# Patient Record
Sex: MALE | Race: WHITE
[De-identification: names, ages, dates, MRNs, and addresses within clinical notes are randomized per-mention and may not be internally consistent; named-entity substitution may affect disease eponyms.]

---

## 2020-03-28 ENCOUNTER — HOSPITAL ENCOUNTER (EMERGENCY)
Dept: HOSPITAL 25 - UCCORT | Age: 55
Discharge: HOME | End: 2020-03-28
Payer: COMMERCIAL

## 2020-03-28 VITALS — SYSTOLIC BLOOD PRESSURE: 137 MMHG | DIASTOLIC BLOOD PRESSURE: 89 MMHG

## 2020-03-28 DIAGNOSIS — I10: ICD-10-CM
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
## 2020-03-28 DIAGNOSIS — F17.210: ICD-10-CM
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
## 2020-03-28 DIAGNOSIS — Z79.01: ICD-10-CM

## 2020-03-28 DIAGNOSIS — Z79.899: ICD-10-CM

## 2020-03-28 DIAGNOSIS — I48.91: ICD-10-CM
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
## 2020-03-28 DIAGNOSIS — K21.9: ICD-10-CM

## 2020-03-28 DIAGNOSIS — E03.9: ICD-10-CM

## 2020-03-28 DIAGNOSIS — Z79.890: ICD-10-CM

## 2020-03-28 DIAGNOSIS — S93.402A: Primary | ICD-10-CM

## 2020-03-28 PROCEDURE — 99213 OFFICE O/P EST LOW 20 MIN: CPT

## 2020-03-28 PROCEDURE — G0463 HOSPITAL OUTPT CLINIC VISIT: HCPCS

## 2020-03-28 NOTE — UC
Lower Extremity/Ankle HPI





- HPI Summary


HPI Summary: 





53 yo man, who slipped on ice while leaving his barn evening of 3/25/20. He has 

increasing swelling in the medial malleolus and pain with weight bearing.  








- History of Current Complaint


Chief Complaint: UCLowerExtremity


Stated Complaint: LEFT ANKLE INJURY


Hx Obtained From: Patient


Onset/Duration: Sudden Onset, Lasting Days - 3


Severity Initially: Moderate


Severity Currently: Moderate


Pain Intensity: 7


Aggravating Factor(s): Standing, Ambulation


Alleviating Factor(s): Rest, Elevation, Ice, OTC Meds - has been taking 

ibuprofen 800mg


Able to Bear Weight: Yes - partial





- Risk Factors


Gout Risk Factors: Hypertension, Obesity


DVT Risk Factors: Negative


Septic Arthritis Risk Factor: Negative





- Allergies/Home Medications


Allergies/Adverse Reactions: 


 Allergies











Allergy/AdvReac Type Severity Reaction Status Date / Time


 


No Known Allergies Allergy   Verified 20 12:05











Home Medications: 


 Home Medications





Amlodipine Besylate [Norvasc] 10 mg PO DAILY 20 [History Confirmed ]


Apixaban* [Eliquis*] 5 mg PO BID 20 [History Confirmed 20]


Colchicine* [Colcrys*] 0.6 mg PO DAILY 20 [History Confirmed 20]


Dexlansoprazole [Dexilant] 60 mg PO DAILY 20 [History Confirmed 20]


Hydrochlorothiazide TAB* [Hydrodiuril TAB*] 25 mg PO DAILY 20 [History 

Confirmed 20]


Levothyroxine TAB* [Synthroid TAB*] 25 mcg PO QAM 20 [History Confirmed ]


Metoprolol Succinate [Metoprolol Succinate ER] 25 mg PO DAILY 20 [History 

Confirmed 20]


Potassium Chlor TAB* [Klor Con ER TAB*] 10 meq PO DAILY 20 [History 

Confirmed 20]











PMH/Surg Hx/FS Hx/Imm Hx


Previously Healthy: Yes - recent onset of a fib


Endocrine History: Hypothyroidism


Cardiovascular History: Hypertension, Atrial Fibrillation - overnight 

observation due to onset of a fib last week


GI/ History: Gastroesophageal Reflux





- Surgical History


Surgical History: None





- Family History


Known Family History: Positive: Cardiac Disease





- Social History


Occupation: Employed Full-time


Alcohol Use: Occasionally


Substance Use Type: None


Smoking Status (MU): Heavy Every Day Tobacco Smoker


Type: Cigarettes


Amount Used/How Often: 1 ppd





Review of Systems


All Other Systems Reviewed And Are Negative: Yes


Constitutional: Positive: Negative


Skin: Positive: Negative


Eyes: Positive: Negative


ENT: Positive: Negative


Respiratory: Positive: Negative


Cardiovascular: Negative: Palpitations - converted back to sinus rhythm.


Gastrointestinal: Positive: Negative


Genitourinary: Positive: Negative


Motor: Positive: Decreased ROM - left ankle


Musculoskeletal: Positive: Arthralgia


Neurological/Mental Status: Positive: Negative


Psychological: Positive: Negative


Is Patient Immunocompromised?: No





Physical Exam


Triage Information Reviewed: Yes


Appearance: Well-Appearing, Pain Distress - mild to moderate, Obese


Vital Signs: 


 Initial Vital Signs











Temp  98 F   20 12:08


 


Pulse  96   20 12:08


 


Resp  17   20 12:08


 


BP  137/89   20 12:08


 


Pulse Ox  100   20 12:08











ENT: Positive: Normal ENT inspection


Neck: Positive: Supple, Nontender, No Lymphadenopathy


Respiratory: Positive: Lungs clear, Normal breath sounds


Cardiovascular: Positive: RRR, No Murmur


Musculoskeletal: Positive: Strength Intact, No Edema, ROM Limited @ - left ankle


Neurological Exam: Other - left ankle with moderate swelling  and ecchymosis of 

the medial malleolus. Mild erythema of the first MTP joint but no pain


Psychological Exam: Normal


Skin Exam: Normal





Diagnostics





- Radiology


  ** No standard instances


Radiology Interpretation Completed By: Radiologist - Patient Name:        KARLOS NINO                                                                  Medical 

Record#: [id number] Ordering Physician: Bibiana Morejon MD                       

                                        Acct.#: [id number] :     1965        Age: 54   Sex: M                                                   

     Location: URGENT CARE [facility] Exam Date: 20 1205                 

                                                          ADM Status: REG ER 

Order Information:                        ANKLE LEFT 3+VWS Accession Number:   

                      [id number] CPT:                                      

30098 Indication: Left ankle pain. 3 views of the left ankle demonstrates no 

fracture or dislocation. No other bone or joint abnormalities identified. 

IMPRESSION: No fracture of the ankle is noted.   _______________________________

_____________________________ <Electronically signed by Cleopatra Schmitt MD in [place]>  

20 1236 Dictated By: Cleopatra Schmitt MD Dictated Date/Time: 20 1235 

Transcribed Date/Time: 20 1235 Copy to:    CC:Rosalio Ontiveros MD; [facility]; Bibiana Morejon MD Imaging - [facility]                 

              Imaging - [facility]                                   

Imaging - [facility]  [address]                                

     [address] 

[id number] [place] [id number] ph [phone])                                   ph ([phone])                  

                            ph ([phone])                     This report 

is only to be considered final once signed by the Provider(s) as displayed in 

the "<Electronically Signed by >" field (s). Absence of a  signature indicates 

the report is in a draft status and still needs to be finalized. In the event 

this document was created by someone other than the  signing Provider, the 

individual initiating the document will be listed in the "Entered by:" or 

"Dictated by:" fields.                                                         

      1 of 1





Lower Extremity Course/Dx





- Course


Course Of Treatment: 





CAM walker and crutches, refer orthopedics


Advised not to combine nsaid's and use of Eliquis and risks of this reviewed. 





- Differential Dx/Diagnosis


Differential Diagnosis/HQI/PQRI: Fracture (Closed), Sprain, Strain


Provider Diagnosis: 


 Sprain of left ankle








Discharge ED





- Sign-Out/Discharge


Documenting (check all that apply): Patient Departure


All imaging exams completed and their final reports reviewed: Yes





- Discharge Plan


Condition: Stable


Disposition: HOME


Patient Education Materials:  Ankle Sprain (ED)


Referrals: 


[facility] [Primary Care Provider] - 


Joselito Dale MD [Medical Doctor] - 


Additional Instructions: 


Your ankle has been immobilized due to high grade sprain. 


Please call Dr. Dale on Monday to arrange an orthopedic evaluation. 


Stop use of ibuprofen and use acetaminophen 650mg up to 4 times daily as needed 

for control of pain. 


Use ice to the medial ankle for 20 mintues every 2 to 3 hours, and keep your 

leg elevated as much as possible.  





- Billing Disposition and Condition


Condition: STABLE


Disposition: Home

## 2020-03-28 NOTE — XMS REPORT
Continuity of Care Document (CCD)

 Created on:2020



Patient:Joaquin Loera

Sex:Male

:1965

External Reference #:[id number]





Demographics







 Address  [address] [place] [address]



   [address]



   [place] [address]

 

 Home Phone  [phone]

 

 Mobile Phone  [phone]

 

 Work Phone  [phone]

 

 Preferred Language  en

 

 Marital Status  Declined to Specify/Unknown

 

 Mandaen Affiliation  Unknown

 

 Race  White

 

 Ethnic Group  Declined to Specify/Unknown









Author







 Name  Vitor Cannon NP (transmitted by agent of provider [employer] Post[employer])

 

 Address  [address] [facility]/[address]



   [place] [address]









Care Team Providers







 Name  Role  Phone

 

 Rosalio Ontiveros MD - Family Medicine  Care Team Information   [phone]
[phone]









Problems







 Active Problems  Provider  Date

 

 Benign essential hypertension  Rosalio Ontiveros M.D.  Onset: 2012

 

 Indigestion  Rosalio Ontiveros M.D.  Onset: 2012

 

 Mixed hyperlipidemia  Rosalio Ontiveros M.D.  Onset: 2012

 

 Tobacco user  Rosalio Ontiveros M.D.  Onset: 2012

 

 Peptic reflux disease  Rosalio Ontiveros M.D.  Onset: 2012

 

 Umbilical hernia  Rosalio Ontiveros M.D.  Onset: 2012

 

 Osteoarthritis  Rosalio Ontiveros M.D.  Onset: 2013

 

 Blood chemistry abnormal  Rosalio Ontiveros M.D.  Onset: 2013

 

 Gout  Rosalio Ontiveros M.D.  Onset: 2013

 

 Essential hypertension  Rosalio Ontiveros M.D.  Onset: 2016







Social History







 Type  Date  Description  Comments

 

 Birth Sex    Unknown  

 

 Tobacco Use  Start: Unknown  Current Cigarette Smoker  pt is a pack a day



       smoker for 25 years

 

 ETOH Use    Occasionally consumes  



     alcohol  

 

 Tobacco Use  Start: Unknown  Patient is a current  



     smoker, smokes every day  

 

 Smoking Status  Reviewed: 20  Patient is a current  



     smoker, smokes every day  

 

 Seat Belt/Car Seat    Always uses seat belt  







Allergies, Adverse Reactions, Alerts







 Active Allergies  Reaction  Severity  Comments  Date

 

 Ace Inhibitors        2012







Medications







 Active Medications  SIG  Qnty  Indications  Ordering Provider  Date

 

 Amoxicillin  1 tab by mouth  14caps  J20.9  Rosalio Ontiveros,  03/10/2020



           500mg  twice a day      M.D.  



 Capsules          



           

 

 Prednisone  3 tabs for 3  18tabs  J20.9  Rosalio Ontiveros,  03/10/2020



          20mg Tablets  days, then 2      M.D.  



   tabs for 3 days        



   then 1 tab for 3        



   days        

 

 Potassium Chloride ER  1 Tab PO Daily  90tabs  E87.6  Rosalio Ontiveros,  03/10/
2020



         M.D.  



 10Meq Tablets ER          



           

 

 Ibuprofen  1 by mouth three  90tabs  M10.9  Rosalio Ontiveros,  2019



         800mg Tablets  times a day as      M.D.  



   needed        









 M15.9









 Hydrochlorothiazide  take 1 tablet by  90tabs  I10  Rosalio Ontiveros,  05/10/
2019



           25mg Tablets  mouth once daily      M.D.  



           

 

 Ventolin HFA  2 puffs q4-6  36gm  J44.9  Rosalio Ontiveros,  2013



    108(90Base) mcg/Act  hours as needed      M.DKrissy  



 Aerosol          

 

 Colcrys  1 tab by mouth  90tabs  M10.9  Rosalio Ontiveros,  2013



 0.6mg Tablets  every day      M.DKrissy  



           

 

 Dexilant  1 by mouth every  90caps  K30  Rosalio Ontiveros,  2012



 60mg Capsules DR  day      M.D.  



           









 K21.0









 Norvasc  1 by mouth every day  90tabs  I10  Rosalio Ontiveros M.D.  2012



      10mg Tablets          



           









 History Medications









 Knee Brace Adjustable  on 12h off 12h  1units    Rosalio Ontiveros,  2019 -



 Hinged/Maximum Support        M.DKrissy  2020



                   Misc          



           







Immunizations







 CPT Code  Status  Date  Vaccine  Lot #

 

 [id number]  Given  2013  Flu Vaccine  [id number]

 

 65511  Given  2012  Flu Vaccine  [id number]

 

 74388  Given  2010  TDaP  

 

 92310  Given  2010  Flu Vaccine  









 









 53229  Refused  2020  Flu Virus Vaccine, Quadrivalent, Slit Virus, Im 
Use  







Vital Signs







 Date  Vital  Result  Comment

 

 03/10/2020  1:46pm  BP Systolic  148 mmHg  









 BP Diastolic  76 mmHg  

 

 Height  72 inches  6'0"

 

 Weight  262.00 lb  

 

 BMI (Body Mass Index)  35.5 kg/m2  

 

 Heart Rate  99 /min  

 

 Respiratory Rate  16 /min  









 2020  8:44am  BP Systolic  160 mmHg  









 BP Diastolic  82 mmHg  

 

 BP Systolic Recheck  158 mmHg  

 

 BP Diastolic Recheck  76 mmHg  

 

 Height  72 inches  6'0"

 

 Weight  265.00 lb  

 

 BMI (Body Mass Index)  35.9 kg/m2  

 

 Heart Rate  107 /min  

 

 Respiratory Rate  16 /min  







Results







 Test  Acquired Date  Facility  Test  Result  H/L  Range  Note

 

 CBS W/Automated  2020  [facility]  White Blood  9.4 K/uL  Normal  3.4-10.5
  1



 Diff      Count        









 Red Blood Count  4.87 M/uL  Normal  4.20-5.80  

 

 Hemoglobin  15.6 gm/dL  Normal  12.8-17.0  

 

 Hematocrit  45.4 %  Normal  38.0-48.0  

 

 Mean Cell Volume  93.2 fl  Normal  80.0-96.0  

 

 Mean Corpuscular HGB  32.0 pg  Normal  27.0-33.0  

 

 Mean Corpuscular HGB Conc  34.4 g/dL  Normal  31.7-36.0  

 

 Platelet Count  224 K/uL  Normal  155-360  

 

 Red Cell Distri Width SD  43.2 fl  Normal  36-51  

 

 Red Cell Distri Width %CV  12.7 %  Normal  11.6-15.8  

 

 Mean Platelet Volume  10.9 fl  High  6.6-10.6  

 

 Neut%  50.8 %  Normal  33.0-73.0  

 

 Lymph %  35.9 %  Normal  20.0-42.0  

 

 Mono %  8.8 %  Normal  0.0-10.0  

 

 Eo%  3.0 %  Normal  0.0-6.6  

 

 Bas%  1.1 %  Normal  0.0-1.1  

 

 Immature Grans  0.4 %  Normal  0.0-5.0  

 

 NRBC %  0.0 /100WBC    < 10/ 100 WBC  

 

 Neut#  4.78 K/uL  Normal  1.8-7.0  

 

 Lymph #  3.38 K/uL  Normal  1.0-4.0  

 

 Mono #  0.83 K/uL  High  0.0-0.8  

 

 Eos #  0.28 K/uL  Normal  0.0-0.5  

 

 Baso #  0.10 K/uL  Normal  0.0-0.1  

 

 Immature Grans Absolute  0.04 K/uL      

 

 NRBC #  0.00 K/uL      









 Basic Metabolic Panel  2020  [facility]  Glucose  143 mg/dL  High    









 BUN  10 mg/dL  Normal  7-18  

 

 Creatinine  0.7 mg/dL  Normal  0.6-1.3  

 

 Glom Filtration Rate, Estimate  >60 mL/min    >60  

 

 If African American  >60 mL/min    >60  2

 

 BUN/Creat  14.2 ratio      

 

 Sodium  140 mmol/L  Normal  136-145  

 

 Potassium  3.1 mmol/L  Low  3.5-5.1  

 

 Chloride  103 mmol/L  Normal    

 

 Carbon Dioxide  32 mmol/L  Normal  21-32  

 

 Anion Gap  5 mEq/L  Low  8-16  

 

 Calcium  8.9 mg/dL  Normal  8.5-10.1  









 Ua RFX Micro & Culture II  03/10/2020  [facility]  Urine Color  Yellow    Yellow
  









 Urine Clarity  Slightly Cloudy    Clear  

 

 Urine Glucose - Dipstick  NEGATIVE mg/dL    Negative  

 

 Urine Bilirubin - Dipstick  NEGATIVE    Negative  

 

 Urine Ketone  NEGATIVE mg/dL    Negative  

 

 Urine Specific Gravity  1.016  Normal  1.010-1.030  

 

 Urine Blood  NEGATIVE    Negative  

 

 Urine PH  7.0  Normal  6.5-7.5  

 

 Urine Protein - Dipstick  NEGATIVE mg/dL    Negative  

 

 Urine Urobilinogen - Dipstick  < 2.0 mg/dL    < 2.0  

 

 Urine Nitrite - Dipstick  NEGATIVE    Negative  

 

 Urine Leuk Esterase  NEGATIVE    Negative  

 

 Source:  URINE, CLEAN CAT <SEE NOTE>      3









 Aot Request  03/10/2020  [facility]  Aot Request  Already done      4, 5









 Tests to be added:  MAGNESIUM      









 CBC With Diff  2020  [facility]  WBC  9.0 10*3/uL    (4.1-11.0)  



     [address] [facility]          



     (607)-   -          









 RBC  5.15 10*6/uL    (4.60-6.10)  

 

 HGB  16.8 g/dL    (13.5-18.0)  

 

 HCT  47.6 %    (41.0-53.0)  

 

 MCV  92.5 fL    (80.0-95.0)  

 

 MCH  32.6 pg  High  (27.0-32.0)  

 

 MCHC  35.3 g/dL    (32.0-36.0)  

 

 RDW  12.9 %    (10.5-14.5)  

 

 PLT  253 10*3/uL    (150-450)  

 

 MPV  9.2 fL    (7.1-10.7)  

 

 Neut %  47.6 %    (35.0-75.0)  

 

 Lymph %  41.5 %    (16.0-52.0)  

 

 Mono %  8.3 %  High  (0.0-8.0)  

 

 Eos %  2.2 %    (0.0-5.0)  

 

 Baso %  0.4 %    (0.0-4.0)  

 

 Neut #  4.3 10*3/uL    (1.8-7.7)  

 

 Lymph #  3.7 10*3/uL    (1.2-4.8)  

 

 Mono #  0.7 10*3/uL    (0.0-0.8)  

 

 Eos #  0.2 10*3/uL    (0.0-0.5)  

 

 Baso #  0.0 10*3/uL    (0.0-0.2)  









 CMP  2020  [facility]  Sodium  143 mmol/L    (136-145)  



     [facility]          



     (607)-   -          









 Potassium  3.0 mmol/L  Low  (3.6-5.2)  

 

 Chloride  101 mmol/L    (100-108)  

 

 Co2  35 mmol/L  High  (22-31)  

 

 Anion Gap  7 mmol/L    (7-16)  

 

 Urea Nitrogen  9 mg/dL    (7-24)  

 

 Creatinine  0.93 mg/dL    (0.80-1.30)  

 

 BUN/Creat Ratio  9.7 RATIO  Low  (10.0-20.0)  

 

 Glucose  136 mg/dL  High  (70-99)  

 

 Calcium  9.0 mg/dL    (8.4-10.2)  

 

 Total Protein  7.3 g/dL    (6.4-8.2)  

 

 Albumin  3.8 g/dL    (3.5-4.6)  

 

 Globulin  3.5 g/dL    (2.7-4.3)  

 

 Alb/Glob Ratio  1.1 RATIO      

 

 Alkaline Phosphatase  117 U/L    ()  

 

 Bilirubin,Total  0.8 mg/dL    (0.0-1.0)  

 

 Ast (Sgot)  139 U/L  High  (11-39)  

 

 Alt (SGPT)  191 U/L  High  (12-78)  

 

 GFR  >60 ml/min/1.73m2    (>59)  

 

 GFR ( Amer)  >60 ml/min/1.73m2    (>59)  

 

 GFR Interpretation  <SEE NOTE>      6









 Hemoglobin A1c  2020  [facility]  Hemoglobin A1c @  6.0 %    (4.0-6.0)
  7



     [address] [facility]          



     (607)-   -          









 Est Average Glucose  126 mg/dL      









 Lipid  2020  [facility]  Cholesterol @  189 mg/dL    (0-200)  



     [address] [facility]          



     (607)-   -          









 Triglyceride @  152 mg/dL    ()  

 

 HDL Cholesterol @  40 mg/dL  Low  (>40)  8

 

 Chol/HDL Ratio  4.7 RATIO      9

 

 LDL Chol (Calc)  119 mg/dL    (<130)  10









 Laboratory  2020  [facility]  TSH,Ultrasensitive @  4.640  High  (0.360
-4.170)  



 test finding    [facility]    mIU/L      



     (607)-   -          









 Uric Acid  9.9 mg/dL  High  (3.5-7.2)  









 PSA Free And Total  2020  [facility]  PSA Total  0.7 ng/mL    (0.0-4.0
)  



     [facility]          



     (607)-   -          









 PSA Free  0.2 ng/mL      

 

 PSA % Free  29 %      11









 Laboratory test  2020  [facility]  25 Hydroxy Vit  31 ng/mL    (
)  12



 finding    [address] [facility]  D @        



     (607)-   -          









 Esr  5 mm/h    (0-20)  









 1  PARAXYSMAL AFIB W/RVR

 

 2  Note:



   Persistent reduction for 3 months or more in an eGFR <60



   mL/min/1.73 m2 defines CKD.  Patients with eGFR values >/=60



   mL/min/1.73 m2 may also have CKD if evidence of persistent



   proteinuria is present.



   



   The original MDRD equation for estimated GFR is not valid



   for patients less than 18 years of age.



   



   Additional information may be found at www.kdoqi.org.

 

 3  URINE, CLEAN CATCH

 

 4  IRREGULAR HEART BEAT/SENT BY DOCTOR

 

 5  Tests:



   MAGNESIUM



   Instructions:

 

 6  



   ------------------------------------------



   NORMAL KIDNEY FUNCTION



      OR MILD DISEASE       - GFR >OR= 60



   CHRONIC KIDNEY DISEASE   - GFR 15 - 59



   RENAL FAILURE            - GFR   <15



   ------------------------------------------



   Est. GFR calculation based on the MDRD



   study equation, which assumes a steady



   state for creatinine.  Est. GFR should not



   be used for medication dosing.

 

 7  Performed using Siemens Vista immunoassay.



   Care must be taken when interpreting HbA1c



   results in patients with a hemoglobin variant



   or decreased erythrocyte lifespan.



   



   Values 5.7 - 6.4% suggest prediabetes.



   Values >=6.5% are diagnostic for diabetes.



   REFERENCE: DIABETES CARE 2018: 41(S13-S27).

 

 8  PER NCEP ATP III GUIDELINES:



   RESULTS LOWER THAN 40 MG/DL ARE SUGGESTIVE



   OF INCREASED RISK FOR CORONARY ARTERY



   DISEASE.  RESULTS > OR = TO 60 MG/DL ARE



   CONSIDERED A NEGATIVE RISK FACTOR.

 

 9  INTERPRETATION OF CHOL-HDL RATIO



   



    CHD RISK        FEMALE      MALE



   VERY HIGH           >8.3       >14.3



   HIGH            5.6- 8.3   6.7- 14.3



   AVERAGE         3.7- 5.6   4.0-  6.7



   BELOW AVERAGE   2.5- 3.7   2.7-  4.0



   PROTECTED           <2.5        <2.7

 

 10  PER NCEP ATP III GUIDELINES:



      OPTIMAL          < 100



    NEAR OPTIMAL     100 - 129



   BORDERLINE HIGH   130 - 159



       HIGH          160 - 189



     VERY HIGH         > 189

 

 11  



   % FREE PSA    PROBABILITY OF CANCER



     0 - 10%              56%



    10 - 15%              28%



    15 - 20%              20%



    20 - 25%              16%



   GREATER THAN 25%       8%



   



   THE FREE PSA PERCENTAGE IS AN AID IN



   DISTINGUISHING PROSTATE CANCER FROM



   BENIGN PROSTATIC CONDITIONS IN MEN



   AGE 50 AND OLDER WITH A TOTAL PSA



   BETWEEN 3 AND 10 NG/ML AND NEGATIVE



   DIGITAL RECTAL EXAMINATION FINDINGS.



   PROSTATIC BIOPSY IS REQUIRED FOR THE



   DIAGNOSIS OF CANCER.



   (See:  CLAUDINE 1998;  279: 2806-8530)



   



   METHOD USED TO ASSAY BOTH FREE PSA



   AND TOTAL PSA IS [facility]



   CHEMILUMINESCENT IMMUNOASSAY (CALIBRATION



   TRACEABLE TO WHO 1999, 96/668).



   RESULTS SHOULD NOT BE INTERPRETED AS



   ABSOLUTE EVIDENCE FOR THE PRESENCE OR



   ABSENCE OF MALIGNANT DISEASE.



   VALUES OBTAINED WITH DIFFERENT ASSAY



   METHODS OR KITS CANNOT BE USED



   INTERCHANGEABLY.

 

 12  A REVIEW OF THE LITERATURE SUGGESTS THE



   FOLLOWING RANGES FOR THE CLASSIFICATION



   OF 25-OH VITAMIN D STATUS:



   



   VITAMIN D STATUS      25-OH VITAMIN D



   ----------------      ---------------



   DEFICIENCY                <20 NG/ML



   INSUFFICIENCY           20-30 NG/ML



   SUFFICIENCY             31 - 100 NG/ML



   TOXICITY                 > 100 NG/ML



   



   A PEDIATRIC REFERENCE RANGE HAS NOT BEEN



   ESTABLISHED USING THIS METHOD.







Procedures







 Date  Code  Description  Status

 

 03/10/2020  26572  EKG  Completed

 

 2018  [id number]  Colonoscopy  Completed







[employer]







 Description

 

 No Information Available







Encounters







 Type  Date  Location  Provider  Dx  Diagnosis

 

 Office Visit  03/10/2020  2:00p  Vitor Osman, NP  I10  Essential (
primary)



           hypertension









 E78.2  Mixed hyperlipidemia

 

 R73.01  Impaired fasting glucose

 

 M10.9  Gout, unspecified

 

 R97.20  Elevated prostate specific antigen [PSA]

 

 J44.9  Chronic obstructive pulmonary disease, unspecified

 

 K21.0  Gastro-esophageal reflux disease with esophagitis

 

 F10.10  Alcohol abuse, uncomplicated

 

 K30  Functional dyspepsia

 

 F17.210  Nicotine dependence, cigarettes, uncomplicated

 

 L20.9  Atopic dermatitis, unspecified

 

 J30.9  Allergic rhinitis, unspecified

 

 K42.9  Umbilical hernia without obstruction or gangrene

 

 M15.9  Polyosteoarthritis, unspecified

 

 M20.10  Hallux valgus (acquired), unspecified foot

 

 E66.3  Overweight

 

 K76.9  Liver disease, unspecified

 

 E55.9  Vitamin D deficiency, unspecified

 

 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

 

 M79.604  Pain in right leg

 

 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

 

 G47.33  Obstructive sleep apnea (adult) (pediatric)

 

 M25.561  Pain in right knee

 

 R05  Cough

 

 R06.02  Shortness of breath

 

 E87.6  Hypokalemia

 

 D48.5  Neoplasm of uncertain behavior of skin

 

 E03.9  Hypothyroidism, unspecified

 

 J20.9  Acute bronchitis, unspecified

 

 I48.0  Paroxysmal atrial fibrillation









 Office Visit  2020  9:15a  Rosalio Villaseñor,  I10  Essential (
primary)



       M.D.    hypertension









 E78.2  Mixed hyperlipidemia

 

 R73.01  Impaired fasting glucose

 

 M10.9  Gout, unspecified

 

 R97.20  Elevated prostate specific antigen [PSA]

 

 J44.9  Chronic obstructive pulmonary disease, unspecified

 

 K21.0  Gastro-esophageal reflux disease with esophagitis

 

 F10.10  Alcohol abuse, uncomplicated

 

 K30  Functional dyspepsia

 

 F17.210  Nicotine dependence, cigarettes, uncomplicated

 

 L20.9  Atopic dermatitis, unspecified

 

 J30.9  Allergic rhinitis, unspecified

 

 K42.9  Umbilical hernia without obstruction or gangrene

 

 M15.9  Polyosteoarthritis, unspecified

 

 M20.10  Hallux valgus (acquired), unspecified foot

 

 E66.3  Overweight

 

 K76.9  Liver disease, unspecified

 

 E55.9  Vitamin D deficiency, unspecified

 

 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

 

 M79.604  Pain in right leg

 

 R05  Cough

 

 R09.81  Nasal congestion

 

 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

 

 G47.33  Obstructive sleep apnea (adult) (pediatric)

 

 M25.561  Pain in right knee

 

 H92.03  Otalgia, bilateral









 Office Visit  2019 10:00a  Anne Radford N.P.  I10  Essential (
primary)



           hypertension









 E78.2  Mixed hyperlipidemia

 

 R73.01  Impaired fasting glucose

 

 M10.9  Gout, unspecified

 

 R97.20  Elevated prostate specific antigen [PSA]

 

 J44.9  Chronic obstructive pulmonary disease, unspecified

 

 K21.0  Gastro-esophageal reflux disease with esophagitis

 

 F10.10  Alcohol abuse, uncomplicated

 

 K30  Functional dyspepsia

 

 F17.210  Nicotine dependence, cigarettes, uncomplicated

 

 L20.9  Atopic dermatitis, unspecified

 

 J30.9  Allergic rhinitis, unspecified

 

 K42.9  Umbilical hernia without obstruction or gangrene

 

 M15.9  Polyosteoarthritis, unspecified

 

 M20.10  Hallux valgus (acquired), unspecified foot

 

 E66.3  Overweight

 

 K76.9  Liver disease, unspecified

 

 E55.9  Vitamin D deficiency, unspecified

 

 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

 

 M79.604  Pain in right leg

 

 R05  Cough

 

 R09.81  Nasal congestion

 

 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

 

 G47.33  Obstructive sleep apnea (adult) (pediatric)

 

 M25.561  Pain in right knee

 

 Z23  Encounter for immunization







Assessments







 Date  Code  Description  Provider

 

 03/10/2020  I10  Essential (primary) hypertension  Vitor Cannon, EMMA

 

 03/10/2020  E78.2  Mixed hyperlipidemia  Vitor Cannon, EMMA

 

 03/10/2020  R73.01  Impaired fasting glucose  Vitor Cannon, NP

 

 03/10/2020  R05  Cough  Rosalio Ontiveros M.D.

 

 03/10/2020  M10.9  Gout, unspecified  Vitor Cannon, EMMA

 

 03/10/2020  R97.20  Elevated prostate specific antigen [PSA]  Vitor Cannon, NP

 

 03/10/2020  J44.9  Chronic obstructive pulmonary disease,  CannonVitor vaca, NP



     unspecified  

 

 03/10/2020  K21.0  Gastro-esophageal reflux disease with  Vitor Cannon, NP



     esophagitis  

 

 03/10/2020  J20.9  Acute bronchitis, unspecified  Rosalio Ontiveros M.D.

 

 03/10/2020  F10.10  Alcohol abuse, uncomplicated  Vitor Cannon, NP

 

 03/10/2020  R06.02  Shortness of breath  Rosalio Ontiveros M.D.

 

 03/10/2020  K30  Functional dyspepsia  Vitor Cannon, NP

 

 03/10/2020  I48.0  Paroxysmal atrial fibrillation  Rosalio Ontiveros M.D.

 

 03/10/2020  F17.210  Nicotine dependence, cigarettes,  Vitor Cannon, NP



     uncomplicated  

 

 03/10/2020  L20.9  Atopic dermatitis, unspecified  Vitor Cannon, NP

 

 03/10/2020  H92.03  Otalgia, bilateral  WestonRosalio M.D.

 

 03/10/2020  J30.9  Allergic rhinitis, unspecified  Vitor Cannon, NP

 

 03/10/2020  K42.9  Umbilical hernia without obstruction or  Vitor Cannon, NP



     gangrene  

 

 03/10/2020  M15.9  Polyosteoarthritis, unspecified  Vitor Cannon, NP

 

 03/10/2020  M20.10  Hallux valgus (acquired), unspecified foot  Vitor Cannon, 
NP

 

 03/10/2020  E66.3  Overweight  Vitor Cannon, NP

 

 03/10/2020  K76.9  Liver disease, unspecified  Vitor Cannon, NP

 

 03/10/2020  E55.9  Vitamin D deficiency, unspecified  Vitor Cannon, NP

 

 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

 

 03/10/2020  M79.604  Pain in right leg  Vitor Cannon, NP

 

 03/10/2020  H90.6  Mixed conductive and sensorineural hearing  Vitor Cannon, 
NP



     loss, bilateral  

 

 03/10/2020  G47.33  Obstructive sleep apnea (adult) (pediatric)  Vitor Cannon
, NP

 

 03/10/2020  M25.561  Pain in right knee  Vitor Cannon, NP

 

 03/10/2020  R05  Cough  Vitor Cannon, NP

 

 03/10/2020  R06.02  Shortness of breath  Vitor Cannon, NP

 

 03/10/2020  E87.6  Hypokalemia  Vitor Cannon, NP

 

 03/10/2020  D48.5  Neoplasm of uncertain behavior of skin  Vitor Cannon, NP

 

 03/10/2020  E03.9  Hypothyroidism, unspecified  Vitor Cannon P, NP

 

 03/10/2020  J20.9  Acute bronchitis, unspecified  Vitor Cannon, NP

 

 03/10/2020  I48.0  Paroxysmal atrial fibrillation  Vitor Cannon, NP

 

 2020  I10  Essential (primary) hypertension  Rosalio Ontiveros M.D.

 

 2020  E78.2  Mixed hyperlipidemia  Rosalio Ontiveros M.D.

 

 2020  R73.01  Impaired fasting glucose  Rosalio Ontiveros M.D.

 

 2020  M10.9  Gout, unspecified  Rosalio Ontiveros M.D.

 

 2020  R97.20  Elevated prostate specific antigen [PSA]  Rosalio Ontiveros M.D.

 

 2020  J44.9  Chronic obstructive pulmonary disease,  Rosalio Ontiveros M.D.



     unspecified  

 

 2020  K21.0  Gastro-esophageal reflux disease with  Rosalio Ontiveros M.D.



     esophagitis  

 

 2020  F10.10  Alcohol abuse, uncomplicated  Rosalio Ontiveros M.D.

 

 2020  K30  Functional dyspepsia  Rosalio Ontiveros M.D.

 

 2020  F17.210  Nicotine dependence, cigarettes,  Rosalio Ontiveros M.D.



     uncomplicated  

 

 2020  L20.9  Atopic dermatitis, unspecified  Rosalio Ontiveros M.D.

 

 2020  J30.9  Allergic rhinitis, unspecified  Rosalio Ontiveros M.D.

 

 2020  K42.9  Umbilical hernia without obstruction or  Rosalio Ontiveros M.D.



     gangrene  

 

 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

 

 2020  M20.10  Hallux valgus (acquired), unspecified foot  Rosalio Ontiveros M.D.

 

 2020  E66.3  Overweight  Rosalio Ontiveros M.D.

 

 2020  K76.9  Liver disease, unspecified  Rosalio Ontiveros M.D.

 

 2020  E55.9  Vitamin D deficiency, unspecified  Rosalio Ontiveros M.D.

 

 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

 

 2020  M79.604  Pain in right leg  Rosalio Ontiveros M.D.

 

 2020  R05  Cough  Rosalio Ontiveros M.D.

 

 2020  R09.81  Nasal congestion  Rosalio Ontiveros M.D.

 

 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



     loss, bilateral  

 

 2020  G47.33  Obstructive sleep apnea (adult) (pediatric)  Rosalio Ontiveros M.D.

 

 2020  H92.03  Otalgia, bilateral  Rosalio Ontiveros M.D.

 

 2020  I10  Essential (primary) hypertension  Rosalio Ontiveros M.D.

 

 2020  E78.2  Mixed hyperlipidemia  Rosalio Ontiveros M.D.

 

 2020  R73.01  Impaired fasting glucose  Rosalio Ontiveros M.D.

 

 2020  M10.9  Gout, unspecified  Rosalio Ontiveros M.D.

 

 2020  R97.20  Elevated prostate specific antigen [PSA]  Rosalio Ontiveros M.D.

 

 2020  J44.9  Chronic obstructive pulmonary disease,  Rosalio Ontiveros M.D.



     unspecified  

 

 2020  K21.0  Gastro-esophageal reflux disease with  Rosalio Ontiveros M.D.



     esophagitis  

 

 2020  F10.10  Alcohol abuse, uncomplicated  Rosalio Ontiveros M.D.

 

 2020  K30  Functional dyspepsia  Rosalio Ontiveros M.D.

 

 2020  F17.210  Nicotine dependence, cigarettes,  Rosalio Ontiveros M.D.



     uncomplicated  

 

 2020  L20.9  Atopic dermatitis, unspecified  Rosalio Ontiveors M.D.

 

 2020  J30.9  Allergic rhinitis, unspecified  Rosalio Ontiveros M.D.

 

 2020  K42.9  Umbilical hernia without obstruction or  Rosalio Ontiveros M.D.



     gangrene  

 

 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

 

 2020  M20.10  Hallux valgus (acquired), unspecified foot  Rosalio Ontiveros M.D.

 

 2020  E66.3  Overweight  Rosalio Ontiveros M.D.

 

 2020  K76.9  Liver disease, unspecified  Rosalio Ontiveros M.D.

 

 2020  E55.9  Vitamin D deficiency, unspecified  Rosalio Ontiveros M.D.

 

 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

 

 2020  M79.604  Pain in right leg  Rosalio Ontiveros M.D.

 

 2020  R05  Cough  Rosalio Ontiveros M.D.

 

 2020  R09.81  Nasal congestion  Rosalio Ontiveros M.D.

 

 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



     loss, bilateral  

 

 2020  G47.33  Obstructive sleep apnea (adult) (pediatric)  Rosailo Ontiveros M.D.

 

 2020  H92.03  Otalgia, bilateral  Rosalio Ontiveros M.D.

 

 2020  I10  Essential (primary) hypertension  Rosalio Ontiveros M.D.

 

 2020  E78.2  Mixed hyperlipidemia  Rosalio Ontiveros M.D.

 

 2020  R73.01  Impaired fasting glucose  Rosalio Ontiveros M.D.

 

 2020  M10.9  Gout, unspecified  Rosalio Ontiveros M.D.

 

 2020  R97.20  Elevated prostate specific antigen [PSA]  Rosalio Ontiveros M.D.

 

 2020  J44.9  Chronic obstructive pulmonary disease,  Rosalio Ontiveros M.D.



     unspecified  

 

 2020  K21.0  Gastro-esophageal reflux disease with  Rosalio Ontiveros M.D.



     esophagitis  

 

 2020  F10.10  Alcohol abuse, uncomplicated  Rosalio Ontiveros M.D.

 

 2020  K30  Functional dyspepsia  Rosalio Ontiveros M.D.

 

 2020  F17.210  Nicotine dependence, cigarettes,  Rosalio Ontiveros M.D.



     uncomplicated  

 

 2020  L20.9  Atopic dermatitis, unspecified  Rosalio Ontiveros M.D.

 

 2020  J30.9  Allergic rhinitis, unspecified  Rosalio Ontiveros M.D.

 

 2020  K42.9  Umbilical hernia without obstruction or  Rosalio Ontiveros M.D.



     gangrene  

 

 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

 

 2020  M20.10  Hallux valgus (acquired), unspecified foot  Rosalio Ontiveros M.D.

 

 2020  E66.3  Overweight  Rosalio Ontiveros M.D.

 

 2020  K76.9  Liver disease, unspecified  Rosalio Ontiveros M.D.

 

 2020  E55.9  Vitamin D deficiency, unspecified  Rosalio Ontiveros M.D.

 

 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

 

 2020  M79.604  Pain in right leg  Rosalio Ontiveros M.D.

 

 2020  R05  Cough  Rosalio Ontiveros M.D.

 

 2020  R09.81  Nasal congestion  Rosalio Ontiveros M.D.

 

 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



     loss, bilateral  

 

 2020  G47.33  Obstructive sleep apnea (adult) (pediatric)  Rosalio Ontiveros M.D.

 

 2020  M25.561  Pain in right knee  Rosalio Ontiveros M.D.

 

 2020  H92.03  Otalgia, bilateral  Rosalio Ontiveros M.D.

 

 2019  I10  Essential (primary) hypertension  Osiel Radford N.P.

 

 2019  E78.2  Mixed hyperlipidemia  Osiel Radford N.P.

 

 2019  R73.01  Impaired fasting glucose  Osiel Radford N.PKrissy

 

 2019  M10.9  Gout, unspecified  Osiel Radford N.PKrissy

 

 2019  R97.20  Elevated prostate specific antigen [PSA]  Osiel Radford 
N.PKrissy

 

 2019  J44.9  Chronic obstructive pulmonary disease,  Osiel Radford N.P.



     unspecified  

 

 2019  K21.0  Gastro-esophageal reflux disease with  Osiel Radford N.PKrissy



     esophagitis  

 

 2019  F10.10  Alcohol abuse, uncomplicated  Osiel Radford N.P.

 

 2019  K30  Functional dyspepsia  Osiel Radford N.PKrissy

 

 2019  F17.210  Nicotine dependence, cigarettes,  Osiel Radford N.P.



     uncomplicated  

 

 2019  L20.9  Atopic dermatitis, unspecified  Osiel Radford, N.P.

 

 2019  J30.9  Allergic rhinitis, unspecified  Osiel Radford, N.P.

 

 2019  K42.9  Umbilical hernia without obstruction or  Osiel Radford, N.P.



     gangrene  

 

 2019  M15.9  Polyosteoarthritis, unspecified  Osiel Radford, N.P.

 

 2019  M20.10  Hallux valgus (acquired), unspecified foot  Osiel Radford, 
N.P.

 

 2019  E66.3  Overweight  Osiel Radford, N.P.

 

 2019  K76.9  Liver disease, unspecified  Osiel Radford, N.P.

 

 2019  E55.9  Vitamin D deficiency, unspecified  Osiel Radford, N.P.

 

 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

 

 2019  M79.604  Pain in right leg  Osiel Radford, N.P.

 

 2019  R05  Cough  Osiel Radford, N.P.

 

 2019  R09.81  Nasal congestion  Osiel Radford, N.P.

 

 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 
N.P.



     loss, bilateral  

 

 2019  G47.33  Obstructive sleep apnea (adult) (pediatric)  Osiel Radford
, N.P.

 

 2019  M25.561  Pain in right knee  Osiel Radford, N.P.

 

 2019  Z23  Encounter for immunization  Osiel Radford N.P.







Plan of Treatment

03/10/2020 - Vitor Cannon, NPI10 Essential (primary) hypertensionComments:
CHECK BP TIW ( PRN)DIET AND FLUID COUNSELING LOW SODIUM DIETWT LOSSF/U LAB 
SMOKING KDXLKREKQP77.2 Mixed hyperlipidemiaComments:DIET REVIEWED CONTINUE 
DIETWT LOSSF/U LAB FBWR73.01 Impaired fasting glucoseComments:F/U HGAICFS QA 
AN HS PRNLOW GLUCOSE DIETM10.9 Gout, unspecifiedComments:EXERCISE/HEAT/MESSAGE 
TAKE MEDICATIONS AS DIRECTEDF/U AS DIRECTEDCALL WITH QUESTIONS OR 
CONCERNSWEIGHT BEARING AS MXBKQOLOEP04.20 Elevated prostate specific antigen [
PSA]Comments:OBSERVEF/U WITH UROLOGYASYMPTOMATIC AT THIS TIMEJ44.9 Chronic 
obstructive pulmonary disease, unspecifiedComments:INCREASE PO FLUIDRESTSMOKING 
AHYVKICAMT22.0 Gastro-esophageal reflux disease with esophagitisComments:AVOID 
CAFFEINE, ETOH AND SPICY FOODSTUMS OR MYLANTA PRN CALL WITH PROBLEMS OR 
CONCERNSSMOKING YWWFATHFTD10.10 Alcohol abuse, uncomplicatedComments:ETOH 
ABSTINENCECOUNCELLING AND CZVTVMMIFKJN51 Functional dyspepsiaComments:AVOID 
CAFFEINE, ETOH AND SPICY FOODSTUMS OR MYLANTA PRN CALL WITH PROBLEMS OR 
CONCERNSSMOKING FLTPXVZWLV71.210 Nicotine dependence, cigarettes, 
uncomplicatedComments:SMOKING CESSATION COUNCELLING  MORE THAN 4 MINUTES SPENT 
DISSUCUSING SMOKING CESSATION WUCDRIA66.9 Atopic dermatitis, unspecifiedComments
:SKIN CARE INSTRUCTIONS  LOTION OR BABY OIL 2-3  APPLICATION PER DAYUSE 
MOISTURIZING SOAPAVOID PROLONGED WATER EXPOSUREAVOID USING HOT WATER IN 
GWPHVXV70.9 Allergic rhinitis, unspecifiedComments:INCREASE PO FLUID USE 
ANTIHISTAMINE PRN SECOND HAND SMOKING AVOIDANCE  SMOKING YEMKVUNGQF45.9 
Umbilical hernia without obstruction or gangreneComments:STABLE ABDOMINAL 
BINDER PRNM15.9 Polyosteoarthritis, unspecifiedComments:EXERCISE/HEAT/
MESSAGETYLENOL OR MOTRIN PRNAVOID HEAVY LIFTINGWT LOSSM20.10 Hallux valgus (
acquired), unspecified footComments:EXERCISE/HEAT/MESSAGETYLENO OR MOTRIN PRNWT 
LOSSTHICK SHOESUSE SHOE SKTQOCTAP72.3 OverweightComments:WT LOSS OPTIONS 
EXERCISE INSTRUCTIONSDIET ICDXGIQAKGQT35.9 Liver disease, unspecifiedComments:
ASYMPTOMATIC AND STABLEF/ULABAVOID ETOH MMTVKP30.9 Vitamin D deficiency, 
unspecifiedComments:INCREASE EXPOSURE TO SUNREVIEW OF DIETH44.133 Sympathetic 
uveitis, bilateralComments:[place],LAB BY EZKAVJLDBNMEGF87.604 Pain in right 
legComments:TYLENOL OR MOTRIN PRN EXERCISE/HEAT/BCQHCKOW97.6 Mixed conductive 
and sensorineural hearing loss, bilateralComments:OBSERVE F/U WITH ENT PRN 
SMOKING LEQCVUUHHU47.33 Obstructive sleep apnea (adult) (pediatric)Comments:
OBSERVEWT LOSS SMOKING WHQGIXTRSC66.561 Pain in right kneeComments:LNAPERAQU35 
CoughComments:CHRONIC MOSTLY WHILE TAKING. CURRENT SMOKER.INCREASE CLEAR 
LIQUIDSSMOKING OJNVCKVYAM89.02 Shortness of breathComments:ACUTE ON CHRONIC. NO 
T RESPONDING TO MDI INHALER. MAY BE RELATED TO EMERGING COMMON COLD OR RELATED 
TO NEW ARRYTHMIA SEEN ON EKG. DIRECTING TO ER FOR FURTHER EVALUATION.E87.6 
HypokalemiaNew Medication:Potassium Chloride ER 10 Meq - 1 Tab PO DailyComments:
F/U LAB  DIETERY SUPPLEMENTFollow up:1 bmlhyE41.5 Neoplasm of uncertain 
behavior of skinComments:RAISED LESION. IRREGULAR, MULTI COLORED, LARGER THAN 
PENCIL ERASER. WILL REFERE TO DERM.Referral:[facility] [place], 
NpsbeekgjzyN33.9 Hypothyroidism, unspecifiedComments:MOST RECENT TSH 4.6. PRIOR 
TSH NORMAL. WILL REASSESS IN ONE MONTH WITH LAB.J20.9 Acute bronchitis, 
unspecifiedNew Medication:Amoxicillin 500 mg - 1 tab by mouth twice a 
dayPrednisone 20 mg - 3 tabs for 3 days, then 2 tabs for 3 days then 1 tab for 
3 daysComments:INCREASE PO FLUIDRESTSMOKING CESSATION HTPPEPCYBSFD70.0 
Paroxysmal atrial fibrillationComments:NEW IRREGULAR HEART RHYTHM CONFERMED ON 
[facility] AND COMPAIRED TO PRIOR EKG FROM 2019. CONCERN FOR ATRIALFIBRILLATION AND 
SEQUELA. DIRECTED TO [facility] FOR FURTHER EVALUATION AND REPORTED TO DR. MODI BYPHONE IN ER.



Functional Status







 Description

 

 No Information Available







Mental Status







 Description

 

 No Information Available







Referrals







 Refer to   Reason for Referral  Status  Appt Date

 

 [facility] [place]  IRREGULAR MULTI COLOR RAISED LESION TO  Created  




   RIGHT POSTERIOR UPPER ARM    









 [address] [place] [address] [phone]

## 2020-03-28 NOTE — XMS REPORT
Continuity of Care Document (CCD)

 Created on:March 10, 2020



Patient:Joaquin Loera

Sex:Male

:1965

External Reference #:[id number]





Demographics







 Address  [address] [place] [address]



   [address]



   [place] [address]

 

 Home Phone  [phone]

 

 Mobile Phone  [phone]

 

 Work Phone  [phone]

 

 Preferred Language  en

 

 Marital Status  Declined to Specify/Unknown

 

 Nondenominational Affiliation  Unknown

 

 Race  White

 

 Ethnic Group  Declined to Specify/Unknown









Author







 Name  Vitor Cannon NP

 

 Address  [address] [facility]/[address]



   [place] [address]









Care Team Providers







 Name  Role  Phone

 

 Rosalio Ontiveros MD - Family Medicine  Care Team Information   [phone]
[phone]









Problems







 Active Problems  Provider  Date

 

 Benign essential hypertension  Rosalio Ontiveros M.D.  Onset: 2012

 

 Indigestion  Rosalio Ontiveros M.D.  Onset: 2012

 

 Mixed hyperlipidemia  Rosalio Ontiveros M.D.  Onset: 2012

 

 Tobacco user  Rosalio Ontiveros M.D.  Onset: 2012

 

 Peptic reflux disease  Rosalio Ontiveros M.D.  Onset: 2012

 

 Umbilical hernia  Rosalio Ontiveros M.D.  Onset: 2012

 

 Osteoarthritis  Rosalio Ontiveros M.D.  Onset: 2013

 

 Blood chemistry abnormal  Rosalio Ontiveros M.D.  Onset: 2013

 

 Gout  Rosalio Ontiveros M.D.  Onset: 2013

 

 Essential hypertension  Rosalio Ontiveros M.D.  Onset: 2016







Social History







 Type  Date  Description  Comments

 

 Birth Sex    Unknown  

 

 Tobacco Use  Start: Unknown  Current Cigarette Smoker  pt is a pack a day



       smoker for 25 years

 

 ETOH Use    Occasionally consumes  



     alcohol  

 

 Tobacco Use  Start: Unknown  Patient is a current  



     smoker, smokes every day  

 

 Smoking Status  Reviewed: 20  Patient is a current  



     smoker, smokes every day  

 

 Seat Belt/Car Seat    Always uses seat belt  







Allergies, Adverse Reactions, Alerts







 Active Allergies  Reaction  Severity  Comments  Date

 

 Ace Inhibitors        2012







Medications







 Active Medications  SIG  Qnty  Indications  Ordering Provider  Date

 

 Amoxicillin  1 tab by mouth  14caps  J20.9  Rosalio Ontiveros,  03/10/2020



           500mg  twice a day      M.D.  



 Capsules          



           

 

 Prednisone  3 tabs for 3  18tabs  J20.9  Rosalio Ontiveros,  03/10/2020



          20mg Tablets  days, then 2      M.D.  



   tabs for 3 days        



   then 1 tab for 3        



   days        

 

 Potassium Chloride ER  1 Tab PO Daily  90tabs  E87.6  Rosalio Ontiveros,  03/10/
2020



         M.D.  



 10Meq Tablets ER          



           

 

 Ibuprofen  1 by mouth three  90tabs  M10.9  Rosalio Ontiveros,  2019



         800mg Tablets  times a day as      M.D.  



   needed        









 M15.9









 Hydrochlorothiazide  take 1 tablet by  90tabs  I10  Rosalio Ontiveros,  05/10/
2019



           25mg Tablets  mouth once daily      M.D.  



           

 

 Ventolin HFA  2 puffs q4-6  36gm  J44.9  Rosalio Ontiveros,  2013



    108(90Base) mcg/Act  hours as needed      M.D.  



 Aerosol          

 

 Colcrys  1 tab by mouth  90tabs  M10.9  Rosalio Ontiveros,  2013



 0.6mg Tablets  every day      M.D.  



           

 

 Dexilant  1 by mouth every  90caps  K30  Rosalio Ontiveros,  2012



 60mg Capsules DR  day      M.D.  



           









 K21.0









 Norvasc  1 by mouth every day  90tabs  I10  Rosalio Ontiveros M.DKrissy  2012



      10mg Tablets          



           









 History Medications









 Knee Brace Adjustable  on 12h off 12h  1units    Rosalio Ontiveros,  2019 -



 Hinged/Maximum Support        M.D.  2020



                   Misc          



           







Immunizations







 CPT Code  Status  Date  Vaccine  Lot #

 

 [id number]  Given  2013  Flu Vaccine  [id number]

 

 86029  Given  2012  Flu Vaccine  [id number]

 

 23986  Given  2010  TDaP  

 

 75802  Given  2010  Flu Vaccine  









 









 01557  Refused  2020  Flu Virus Vaccine, Quadrivalent, Slit Virus, Im 
Use  







Vital Signs







 Date  Vital  Result  Comment

 

 03/10/2020  1:46pm  BP Systolic  148 mmHg  









 BP Diastolic  76 mmHg  

 

 Height  72 inches  6'0"

 

 Weight  262.00 lb  

 

 BMI (Body Mass Index)  35.5 kg/m2  

 

 Heart Rate  99 /min  

 

 Respiratory Rate  16 /min  









 2020  8:44am  BP Systolic  160 mmHg  









 BP Diastolic  82 mmHg  

 

 BP Systolic Recheck  158 mmHg  

 

 BP Diastolic Recheck  76 mmHg  

 

 Height  72 inches  6'0"

 

 Weight  265.00 lb  

 

 BMI (Body Mass Index)  35.9 kg/m2  

 

 Heart Rate  107 /min  

 

 Respiratory Rate  16 /min  







Results







 Test  Acquired Date  Facility  Test  Result  H/L  Range  Note

 

 CBC With Diff  2020  [facility]  WBC  9.0 10*3/uL    (4.1-11.0)  



     [facility]          



     (607)-   -          









 RBC  5.15 10*6/uL    (4.60-6.10)  

 

 HGB  16.8 g/dL    (13.5-18.0)  

 

 HCT  47.6 %    (41.0-53.0)  

 

 MCV  92.5 fL    (80.0-95.0)  

 

 MCH  32.6 pg  High  (27.0-32.0)  

 

 MCHC  35.3 g/dL    (32.0-36.0)  

 

 RDW  12.9 %    (10.5-14.5)  

 

 PLT  253 10*3/uL    (150-450)  

 

 MPV  9.2 fL    (7.1-10.7)  

 

 Neut %  47.6 %    (35.0-75.0)  

 

 Lymph %  41.5 %    (16.0-52.0)  

 

 Mono %  8.3 %  High  (0.0-8.0)  

 

 Eos %  2.2 %    (0.0-5.0)  

 

 Baso %  0.4 %    (0.0-4.0)  

 

 Neut #  4.3 10*3/uL    (1.8-7.7)  

 

 Lymph #  3.7 10*3/uL    (1.2-4.8)  

 

 Mono #  0.7 10*3/uL    (0.0-0.8)  

 

 Eos #  0.2 10*3/uL    (0.0-0.5)  

 

 Baso #  0.0 10*3/uL    (0.0-0.2)  









 CMP  2020  [facility]  Sodium  143 mmol/L    (136-145)  



     [facility]          



     (603)-   -          









 Potassium  3.0 mmol/L  Low  (3.6-5.2)  

 

 Chloride  101 mmol/L    (100-108)  

 

 Co2  35 mmol/L  High  (22-31)  

 

 Anion Gap  7 mmol/L    (7-16)  

 

 Urea Nitrogen  9 mg/dL    (7-24)  

 

 Creatinine  0.93 mg/dL    (0.80-1.30)  

 

 BUN/Creat Ratio  9.7 RATIO  Low  (10.0-20.0)  

 

 Glucose  136 mg/dL  High  (70-99)  

 

 Calcium  9.0 mg/dL    (8.4-10.2)  

 

 Total Protein  7.3 g/dL    (6.4-8.2)  

 

 Albumin  3.8 g/dL    (3.5-4.6)  

 

 Globulin  3.5 g/dL    (2.7-4.3)  

 

 Alb/Glob Ratio  1.1 RATIO      

 

 Alkaline Phosphatase  117 U/L    ()  

 

 Bilirubin,Total  0.8 mg/dL    (0.0-1.0)  

 

 Ast (Sgot)  139 U/L  High  (11-39)  

 

 Alt (SGPT)  191 U/L  High  (12-78)  

 

 GFR  >60 ml/min/1.73m2    (>59)  

 

 GFR ( Amer)  >60 ml/min/1.73m2    (>59)  

 

 GFR Interpretation  <SEE NOTE>      1









 Hemoglobin A1c  2020  [facility]  Hemoglobin A1c @  6.0 %    (4.0-6.0)
  2



     [facility]          



     (898)-   -          









 Est Average Glucose  126 mg/dL      









 Lipid  2020  [facility]  Cholesterol @  189 mg/dL    (0-200)  



     [facility]          



     (567)-   -          









 Triglyceride @  152 mg/dL    ()  

 

 HDL Cholesterol @  40 mg/dL  Low  (>40)  3

 

 Chol/HDL Ratio  4.7 RATIO      4

 

 LDL Chol (Calc)  119 mg/dL    (<130)  5









 Laboratory  2020  [facility]  TSH,Ultrasensitive @  4.640  High  (0.360
-4.170)  



 test finding    [facility]    mIU/L      



     (228)-   -          









 Uric Acid  9.9 mg/dL  High  (3.5-7.2)  









 PSA Free And Total  2020  [facility]  PSA Total  0.7 ng/mL    (0.0-4.0
)  



     [facility]          



     (607)-   -          









 PSA Free  0.2 ng/mL      

 

 PSA % Free  29 %      6









 Laboratory test  2020  [facility]  25 Hydroxy Vit  31 ng/mL    (
)  7



 finding    [facility] @        



     (607)-   -          









 Esr  5 mm/h    (0-20)  









 1  



   ------------------------------------------



   NORMAL KIDNEY FUNCTION



      OR MILD DISEASE       - GFR >OR= 60



   CHRONIC KIDNEY DISEASE   - GFR 15 - 59



   RENAL FAILURE            - GFR   <15



   ------------------------------------------



   Est. GFR calculation based on the MDRD



   study equation, which assumes a steady



   state for creatinine.  Est. GFR should not



   be used for medication dosing.

 

 2  Performed using Siemens Vista immunoassay.



   Care must be taken when interpreting HbA1c



   results in patients with a hemoglobin variant



   or decreased erythrocyte lifespan.



   



   Values 5.7 - 6.4% suggest prediabetes.



   Values >=6.5% are diagnostic for diabetes.



   REFERENCE: DIABETES CARE 2018: 41(S13-S27).

 

 3  PER NCEP ATP III GUIDELINES:



   RESULTS LOWER THAN 40 MG/DL ARE SUGGESTIVE



   OF INCREASED RISK FOR CORONARY ARTERY



   DISEASE.  RESULTS > OR = TO 60 MG/DL ARE



   CONSIDERED A NEGATIVE RISK FACTOR.

 

 4  INTERPRETATION OF CHOL-HDL RATIO



   



    CHD RISK        FEMALE      MALE



   VERY HIGH           >8.3       >14.3



   HIGH            5.6- 8.3   6.7- 14.3



   AVERAGE         3.7- 5.6   4.0-  6.7



   BELOW AVERAGE   2.5- 3.7   2.7-  4.0



   PROTECTED           <2.5        <2.7

 

 5  PER NCEP ATP III GUIDELINES:



      OPTIMAL          < 100



    NEAR OPTIMAL     100 - 129



   BORDERLINE HIGH   130 - 159



       HIGH          160 - 189



     VERY HIGH         > 189

 

 6  



   % FREE PSA    PROBABILITY OF CANCER



     0 - 10%              56%



    10 - 15%              28%



    15 - 20%              20%



    20 - 25%              16%



   GREATER THAN 25%       8%



   



   THE FREE PSA PERCENTAGE IS AN AID IN



   DISTINGUISHING PROSTATE CANCER FROM



   BENIGN PROSTATIC CONDITIONS IN MEN



   AGE 50 AND OLDER WITH A TOTAL PSA



   BETWEEN 3 AND 10 NG/ML AND NEGATIVE



   DIGITAL RECTAL EXAMINATION FINDINGS.



   PROSTATIC BIOPSY IS REQUIRED FOR THE



   DIAGNOSIS OF CANCER.



   (See:  CLAUDINE 1998;  279: 9228-7768)



   



   METHOD USED TO ASSAY BOTH FREE PSA



   AND TOTAL PSA IS [facility]



   CHEMILUMINESCENT IMMUNOASSAY (CALIBRATION



   TRACEABLE TO WHO , , 96/668).



   RESULTS SHOULD NOT BE INTERPRETED AS



   ABSOLUTE EVIDENCE FOR THE PRESENCE OR



   ABSENCE OF MALIGNANT DISEASE.



   VALUES OBTAINED WITH DIFFERENT ASSAY



   METHODS OR KITS CANNOT BE USED



   INTERCHANGEABLY.

 

 7  A REVIEW OF THE LITERATURE SUGGESTS THE



   FOLLOWING RANGES FOR THE CLASSIFICATION



   OF 25-OH VITAMIN D STATUS:



   



   VITAMIN D STATUS      25-OH VITAMIN D



   ----------------      ---------------



   DEFICIENCY                <20 NG/ML



   INSUFFICIENCY           20-30 NG/ML



   SUFFICIENCY             31 - 100 NG/ML



   TOXICITY                 > 100 NG/ML



   



   A PEDIATRIC REFERENCE RANGE HAS NOT BEEN



   ESTABLISHED USING THIS METHOD.







Procedures







 Date  Code  Description  Status

 

 03/10/2020  68191  EKG  Completed

 

 2018  [id number]  Colonoscopy  Completed







[employer]







 Description

 

 No Information Available







Encounters







 Type  Date  Location  Provider  Dx  Diagnosis

 

 Office Visit  2020  Rosalio Villaseñor,  I10  Essential (primary)



   9:15a    M.D.    hypertension









 E78.2  Mixed hyperlipidemia

 

 R73.01  Impaired fasting glucose

 

 M10.9  Gout, unspecified

 

 R97.20  Elevated prostate specific antigen [PSA]

 

 J44.9  Chronic obstructive pulmonary disease, unspecified

 

 K21.0  Gastro-esophageal reflux disease with esophagitis

 

 F10.10  Alcohol abuse, uncomplicated

 

 K30  Functional dyspepsia

 

 F17.210  Nicotine dependence, cigarettes, uncomplicated

 

 L20.9  Atopic dermatitis, unspecified

 

 J30.9  Allergic rhinitis, unspecified

 

 K42.9  Umbilical hernia without obstruction or gangrene

 

 M15.9  Polyosteoarthritis, unspecified

 

 M20.10  Hallux valgus (acquired), unspecified foot

 

 E66.3  Overweight

 

 K76.9  Liver disease, unspecified

 

 E55.9  Vitamin D deficiency, unspecified

 

 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

 

 M79.604  Pain in right leg

 

 R05  Cough

 

 R09.81  Nasal congestion

 

 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

 

 G47.33  Obstructive sleep apnea (adult) (pediatric)

 

 M25.561  Pain in right knee

 

 H92.03  Otalgia, bilateral









 Office Visit  2019 10:00a  Anne Radford N.P.  I10  Essential (
primary)



           hypertension









 E78.2  Mixed hyperlipidemia

 

 R73.01  Impaired fasting glucose

 

 M10.9  Gout, unspecified

 

 R97.20  Elevated prostate specific antigen [PSA]

 

 J44.9  Chronic obstructive pulmonary disease, unspecified

 

 K21.0  Gastro-esophageal reflux disease with esophagitis

 

 F10.10  Alcohol abuse, uncomplicated

 

 K30  Functional dyspepsia

 

 F17.210  Nicotine dependence, cigarettes, uncomplicated

 

 L20.9  Atopic dermatitis, unspecified

 

 J30.9  Allergic rhinitis, unspecified

 

 K42.9  Umbilical hernia without obstruction or gangrene

 

 M15.9  Polyosteoarthritis, unspecified

 

 M20.10  Hallux valgus (acquired), unspecified foot

 

 E66.3  Overweight

 

 K76.9  Liver disease, unspecified

 

 E55.9  Vitamin D deficiency, unspecified

 

 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

 

 M79.604  Pain in right leg

 

 R05  Cough

 

 R09.81  Nasal congestion

 

 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

 

 G47.33  Obstructive sleep apnea (adult) (pediatric)

 

 M25.561  Pain in right knee

 

 Z23  Encounter for immunization







Assessments







 Date  Code  Description  Provider

 

 03/10/2020  I10  Essential (primary) hypertension  Vitor Cannon, NP

 

 03/10/2020  E78.2  Mixed hyperlipidemia  Vitor Cannon, NP

 

 03/10/2020  R73.01  Impaired fasting glucose  Vitor Cannon, NP

 

 03/10/2020  M10.9  Gout, unspecified  Vitor Cannon, NP

 

 03/10/2020  R97.20  Elevated prostate specific antigen [PSA]  Vitor Cannon, NP

 

 03/10/2020  J44.9  Chronic obstructive pulmonary disease,  Vitor Cannon, NP



     unspecified  

 

 03/10/2020  K21.0  Gastro-esophageal reflux disease with  Vitor Cannon, NP



     esophagitis  

 

 03/10/2020  F10.10  Alcohol abuse, uncomplicated  Vitor Cannon, NP

 

 03/10/2020  K30  Functional dyspepsia  Vitor Cannon, NP

 

 03/10/2020  F17.210  Nicotine dependence, cigarettes,  Vitor Cannon, NP



     uncomplicated  

 

 03/10/2020  L20.9  Atopic dermatitis, unspecified  Vitor Cannon, NP

 

 03/10/2020  J30.9  Allergic rhinitis, unspecified  Cannon, Vitor P, NP

 

 03/10/2020  K42.9  Umbilical hernia without obstruction or  Cannon, Vitor CARTAGENA, NP



     gangrene  

 

 03/10/2020  M15.9  Polyosteoarthritis, unspecified  Cannon, Vitor P, NP

 

 03/10/2020  M20.10  Hallux valgus (acquired), unspecified foot  Cannon, Vitor P, 
NP

 

 03/10/2020  E66.3  Overweight  Cannon, Vitor P, NP

 

 03/10/2020  K76.9  Liver disease, unspecified  Cannon, Vitor P, NP

 

 03/10/2020  E55.9  Vitamin D deficiency, unspecified  Cannon, Vitor P, NP

 

 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

 

 03/10/2020  M79.604  Pain in right leg  Vitor Cannon, NP

 

 03/10/2020  H90.6  Mixed conductive and sensorineural hearing  CannonVitor, 
NP



     loss, bilateral  

 

 03/10/2020  G47.33  Obstructive sleep apnea (adult) (pediatric)  CannonVitor P
, NP

 

 03/10/2020  M25.561  Pain in right knee  Vitor Cannon, NP

 

 03/10/2020  R05  Cough  CannonVitor P, NP

 

 03/10/2020  R06.02  Shortness of breath  Vitor Cannon P, NP

 

 03/10/2020  E87.6  Hypokalemia  Vitor Cannon P, NP

 

 03/10/2020  D48.5  Neoplasm of uncertain behavior of skin  Vitor Cannon, NP

 

 03/10/2020  E03.9  Hypothyroidism, unspecified  CannonVitor P, NP

 

 03/10/2020  J20.9  Acute bronchitis, unspecified  Vitor Cannon P, NP

 

 2020  I10  Essential (primary) hypertension  Rosalio Ontiveros M.D.

 

 2020  E78.2  Mixed hyperlipidemia  Rosalio Ontiveros M.D.

 

 2020  R73.01  Impaired fasting glucose  Rosalio Ontiveros M.D.

 

 2020  M10.9  Gout, unspecified  Rosalio Ontiveros M.D.

 

 2020  R97.20  Elevated prostate specific antigen [PSA]  Rosalio Ontiveros M.D.

 

 2020  J44.9  Chronic obstructive pulmonary disease,  Rosalio Ontiveros M.D.



     unspecified  

 

 2020  K21.0  Gastro-esophageal reflux disease with  Rosalio Ontiveros M.D.



     esophagitis  

 

 2020  F10.10  Alcohol abuse, uncomplicated  Rosalio Ontiveros M.D.

 

 2020  K30  Functional dyspepsia  Rosalio Ontiveros M.D.

 

 2020  F17.210  Nicotine dependence, cigarettes,  Rosalio Ontiveros M.D.



     uncomplicated  

 

 2020  L20.9  Atopic dermatitis, unspecified  Rosalio Ontiveros M.D.

 

 2020  J30.9  Allergic rhinitis, unspecified  Rosalio Ontiveros M.D.

 

 2020  K42.9  Umbilical hernia without obstruction or  Rosalio Ontiveros M.D.



     gangrene  

 

 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

 

 2020  M20.10  Hallux valgus (acquired), unspecified foot  Rosalio Ontiveros M.D.

 

 2020  E66.3  Overweight  Rosalio Ontiveros M.D.

 

 2020  K76.9  Liver disease, unspecified  Rosalio Ontiveros M.D.

 

 2020  E55.9  Vitamin D deficiency, unspecified  Rosalio Ontiveros M.D.

 

 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

 

 2020  M79.604  Pain in right leg  Rosalio Ontiveros M.D.

 

 2020  R05  Cough  Rosalio Ontiveros M.D.

 

 2020  R09.81  Nasal congestion  Rosalio Ontiveros M.D.

 

 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



     loss, bilateral  

 

 2020  G47.33  Obstructive sleep apnea (adult) (pediatric)  Rosalio Ontiveros M.D.

 

 2020  H92.03  Otalgia, bilateral  Rosalio Ontiveros M.D.

 

 2020  I10  Essential (primary) hypertension  Rosalio Ontiveros M.D.

 

 2020  E78.2  Mixed hyperlipidemia  Rosalio Ontiveros M.D.

 

 2020  R73.01  Impaired fasting glucose  Rosalio Ontiveros M.D.

 

 2020  M10.9  Gout, unspecified  Rosalio Ontiveros M.D.

 

 2020  R97.20  Elevated prostate specific antigen [PSA]  Rosalio Ontiveros M.D.

 

 2020  J44.9  Chronic obstructive pulmonary disease,  Rosalio Ontiveros M.D.



     unspecified  

 

 2020  K21.0  Gastro-esophageal reflux disease with  Rosalio Ontiveros M.D.



     esophagitis  

 

 2020  F10.10  Alcohol abuse, uncomplicated  Rosalio Ontiveros M.D.

 

 2020  K30  Functional dyspepsia  Rosalio Ontiveros M.D.

 

 2020  F17.210  Nicotine dependence, cigarettes,  Rosalio Ontiveros M.D.



     uncomplicated  

 

 2020  L20.9  Atopic dermatitis, unspecified  Rosalio Ontiveros M.D.

 

 2020  J30.9  Allergic rhinitis, unspecified  Rosalio Ontiveros M.D.

 

 2020  K42.9  Umbilical hernia without obstruction or  Rosalio Ontiveros M.D.



     gangrene  

 

 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

 

 2020  M20.10  Hallux valgus (acquired), unspecified foot  Rosalio Ontiveros M.D.

 

 2020  E66.3  Overweight  Rosalio Ontiveros M.D.

 

 2020  K76.9  Liver disease, unspecified  Rosalio Ontiveros M.D.

 

 2020  E55.9  Vitamin D deficiency, unspecified  Rosalio Ontiveros M.D.

 

 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

 

 2020  M79.604  Pain in right leg  Rosalio Ontiveros M.D.

 

 2020  R05  Cough  Rosalio Ontiveros M.D.

 

 2020  R09.81  Nasal congestion  Rosalio Ontiveros M.D.

 

 2020  H90.6  Mixed conductive and sensorineural hearing  Rosaloi Ontiveros M.D.



     loss, bilateral  

 

 2020  G47.33  Obstructive sleep apnea (adult) (pediatric)  Rosalio Ontiveros M.D.

 

 2020  H92.03  Otalgia, bilateral  Rosalio Ontiveros M.D.

 

 2020  I10  Essential (primary) hypertension  Rosalio Ontiveros M.D.

 

 2020  E78.2  Mixed hyperlipidemia  Rosalio Ontiveros M.D.

 

 2020  R73.01  Impaired fasting glucose  Rosalio Ontiveros M.D.

 

 2020  M10.9  Gout, unspecified  Rosalio Ontiveros M.D.

 

 2020  R97.20  Elevated prostate specific antigen [PSA]  Rosalio Ontiveros M.D.

 

 2020  J44.9  Chronic obstructive pulmonary disease,  Rosalio Ontiveros M.D.



     unspecified  

 

 2020  K21.0  Gastro-esophageal reflux disease with  Rosalio Ontiveros M.D.



     esophagitis  

 

 2020  F10.10  Alcohol abuse, uncomplicated  Rosalio Ontiveros M.D.

 

 2020  K30  Functional dyspepsia  Rosalio Ontiveros M.D.

 

 2020  F17.210  Nicotine dependence, cigarettes,  Rosalio Ontiveros M.D.



     uncomplicated  

 

 2020  L20.9  Atopic dermatitis, unspecified  Rosalio Ontiveros M.D.

 

 2020  J30.9  Allergic rhinitis, unspecified  Rosalio Ontiveros M.D.

 

 2020  K42.9  Umbilical hernia without obstruction or  Rosalio Ontiveros M.D.



     gangrene  

 

 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

 

 2020  M20.10  Hallux valgus (acquired), unspecified foot  Rosalio Ontiveros M.D.

 

 2020  E66.3  Overweight  Rosalio Ontiveros M.D.

 

 2020  K76.9  Liver disease, unspecified  Rosalio Ontiveros M.D.

 

 2020  E55.9  Vitamin D deficiency, unspecified  Rosalio Ontiveros M.D.

 

 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

 

 2020  M79.604  Pain in right leg  Rosalio Ontiveros M.D.

 

 2020  R05  Cough  Rosalio Ontiveros M.D.

 

 2020  R09.81  Nasal congestion  Rosalio Ontiveros M.D.

 

 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



     loss, bilateral  

 

 2020  G47.33  Obstructive sleep apnea (adult) (pediatric)  Rosalio Ontiveros M.D.

 

 2020  M25.561  Pain in right knee  Rosalio Ontiveros M.D.

 

 2020  H92.03  Otalgia, bilateral  WestonRosalio M.D.

 

 2019  I10  Essential (primary) hypertension  Osiel Radford, N.P.

 

 2019  E78.2  Mixed hyperlipidemia  Osiel Radford, N.P.

 

 2019  R73.01  Impaired fasting glucose  Osiel Radford, N.P.

 

 2019  M10.9  Gout, unspecified  Osiel Radford, N.P.

 

 2019  R97.20  Elevated prostate specific antigen [PSA]  Osiel Radford, 
N.P.

 

 2019  J44.9  Chronic obstructive pulmonary disease,  Osiel Radford, N.P.



     unspecified  

 

 2019  K21.0  Gastro-esophageal reflux disease with  Osiel Radford, N.P.



     esophagitis  

 

 2019  F10.10  Alcohol abuse, uncomplicated  Osiel Radford, N.P.

 

 2019  K30  Functional dyspepsia  Osiel Radford N.P.

 

 2019  F17.210  Nicotine dependence, cigarettes,  Osiel Radford, N.P.



     uncomplicated  

 

 2019  L20.9  Atopic dermatitis, unspecified  Osiel Radford, N.P.

 

 2019  J30.9  Allergic rhinitis, unspecified  Osiel Radford, N.P.

 

 2019  K42.9  Umbilical hernia without obstruction or  Osiel Radford, N.P.



     gangrene  

 

 2019  M15.9  Polyosteoarthritis, unspecified  Osiel Radford, N.P.

 

 2019  M20.10  Hallux valgus (acquired), unspecified foot  Osiel Radford, 
N.P.

 

 2019  E66.3  Overweight  Osiel Radford, N.P.

 

 2019  K76.9  Liver disease, unspecified  Osiel Radford, N.P.

 

 2019  E55.9  Vitamin D deficiency, unspecified  Osiel Radford, N.P.

 

 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

 

 2019  M79.604  Pain in right leg  Osiel Radford, N.P.

 

 2019  R05  Cough  Osiel Radford, N.P.

 

 2019  R09.81  Nasal congestion  Osiel Radford N.P.

 

 2019  H90.6  Mixed conductive and sensorineural hearing  Osiel Radford N.P.



     loss, bilateral  

 

 2019  G47.33  Obstructive sleep apnea (adult) (pediatric)  Osiel Radford N.P.

 

 2019  M25.561  Pain in right knee  Osiel Radford N.P.

 

 2019  Z23  Encounter for immunization  Osiel Radford N.P.







Plan of Treatment

03/10/2020 - Vitor Cannon, NPI10 Essential (primary) hypertensionComments:
CHECK BP TIW ( PRN)DIET AND FLUID COUNSELING LOW SODIUM DIETWT LOSSF/U LAB 
SMOKING OETLIAZVDO39.2 Mixed hyperlipidemiaComments:DIET REVIEWED CONTINUE 
DIETWT LOSSF/U LAB FBWR73.01 Impaired fasting glucoseComments:F/U HGAICFS QAC 
AN HS PRNLOW GLUCOSE DIETM10.9 Gout, unspecifiedComments:EXERCISE/HEAT/MESSAGE 
TAKE MEDICATIONS AS DIRECTEDF/U AS DIRECTEDCALL WITH QUESTIONS OR 
CONCERNSWEIGHT BEARING AS KCYWPAYHND79.20 Elevated prostate specific antigen [
PSA]Comments:OBSERVEF/U WITH UROLOGYASYMPTOMATIC AT THIS TIMEJ44.9 Chronic 
obstructive pulmonary disease, unspecifiedComments:INCREASE PO FLUIDRESTSMOKING 
LPLXOZAVPS13.0 Gastro-esophageal reflux disease with esophagitisComments:AVOID 
CAFFEINE, ETOH AND SPICY FOODSTUMS OR MYLANTA PRN CALL WITH PROBLEMS OR 
CONCERNSSMOKING BNINRVUKCD91.10 Alcohol abuse, uncomplicatedComments:ETOH 
ABSTINENCECOUNCELLING AND [id number] Functional dyspepsiaComments:AVOID 
CAFFEINE, ETOH AND SPICY FOODSTUMS OR MYLANTA PRN CALL WITH PROBLEMS OR 
CONCERNSSMOKING KJVRICRFOK24.210 Nicotine dependence, cigarettes, 
uncomplicatedComments:SMOKING CESSATION COUNCELLING  MORE THAN 4 MINUTES SPENT 
DISSUCUSING SMOKING CESSATION WXCHUDG74.9 Atopic dermatitis, unspecifiedComments
:SKIN CARE INSTRUCTIONS  LOTION OR BABY OIL 2-3  APPLICATION PER DAYUSE 
MOISTURIZING SOAPAVOID PROLONGED WATER EXPOSUREAVOID USING HOT WATER IN 
UBOBJRE90.9 Allergic rhinitis, unspecifiedComments:INCREASE PO FLUID USE 
ANTIHISTAMINE PRN SECOND HAND SMOKING AVOIDANCE  SMOKING MYYGLWNUXO37.9 
Umbilical hernia without obstruction or gangreneComments:STABLE ABDOMINAL 
BINDER PRNM15.9 Polyosteoarthritis, unspecifiedComments:EXERCISE/HEAT/
MESSAGETYLENOL OR MOTRIN PRNAVOID HEAVY LIFTINGWT LOSSM20.10 Hallux valgus (
acquired), unspecified footComments:EXERCISE/HEAT/MESSAGETYLENO OR MOTRIN PRNWT 
LOSSTHICK SHOESUSE SHOE GRWAYQUNL47.3 OverweightComments:WT LOSS OPTIONS 
EXERCISE INSTRUCTIONSDIET SBBAPAQEONMI74.9 Liver disease, unspecifiedComments:
ASYMPTOMATIC AND STABLEF/ULABAVOID ETOH UYRQYD44.9 Vitamin D deficiency, 
unspecifiedComments:INCREASE EXPOSURE TO SUNREVIEW OF DIETH44.133 Sympathetic 
uveitis, bilateralComments:[place],LAB BY FPIZMZLTALOCAQ73.604 Pain in right 
legComments:TYLENOL OR MOTRIN PRN EXERCISE/HEAT/JXWNOYYT36.6 Mixed conductive 
and sensorineural hearing loss, bilateralComments:OBSERVE F/U WITH ENT PRN 
SMOKING TWVDDFXCSZ91.33 Obstructive sleep apnea (adult) (pediatric)Comments:
OBSERVEWT LOSS SMOKING JZYWUSSVMS07.561 Pain in right kneeComments:RCZLCQRYQ52 
VmqzgM00.02 Shortness of qskaixG05.6 HypokalemiaNew Medication:Potassium 
Chloride ER 10 Meq - 1 Tab PO DailyFollow up:1 zcsjdM33.5 Neoplasm of uncertain 
behavior of skinReferral:[facility] [place], JsemhrqqaggM53.9 
Hypothyroidism, mphllosjtlyG83.9 Acute bronchitis, unspecifiedNew Medication:
Amoxicillin 500 mg - 1 tab by mouth twice a dayPrednisone 20 mg - 3 tabs for 3 
days, then 2 tabs for 3 days then 1 tab for 3 days



Functional Status







 Description

 

 No Information Available







Mental Status







 Description

 

 No Information Available







Referrals







 Refer to Dr  Reason for Referral  Status  Appt Date

 

 [facility] [place]  IRREGULAR MULTI COLOR RAISED LESION TO  Created  




   RIGHT POSTERIOR UPPER ARM    









 [address] [place] [address] [phone]